# Patient Record
Sex: FEMALE | URBAN - METROPOLITAN AREA
[De-identification: names, ages, dates, MRNs, and addresses within clinical notes are randomized per-mention and may not be internally consistent; named-entity substitution may affect disease eponyms.]

---

## 2023-06-05 ENCOUNTER — OFFICE (OUTPATIENT)
Dept: URBAN - METROPOLITAN AREA CLINIC 92 | Facility: CLINIC | Age: 33
Setting detail: OPHTHALMOLOGY
End: 2023-06-05
Payer: COMMERCIAL

## 2023-06-05 DIAGNOSIS — H16.041: ICD-10-CM

## 2023-06-05 PROCEDURE — 99214 OFFICE O/P EST MOD 30 MIN: CPT | Performed by: OPHTHALMOLOGY

## 2023-06-05 PROCEDURE — 92285 EXTERNAL OCULAR PHOTOGRAPHY: CPT | Performed by: OPHTHALMOLOGY

## 2023-06-05 ASSESSMENT — KERATOMETRY
OD_K1POWER_DIOPTERS: 42.25
OS_K2POWER_DIOPTERS: 43.00
OD_AXISANGLE_DEGREES: 067
METHOD_AUTO_MANUAL: AUTO
OS_AXISANGLE_DEGREES: 108
OD_K2POWER_DIOPTERS: 43.25
OS_K1POWER_DIOPTERS: 42.25

## 2023-06-05 ASSESSMENT — REFRACTION_CURRENTRX
OS_AXIS: 115
OS_OVR_VA: 20/
OS_VPRISM_DIRECTION: SV
OS_SPHERE: -11.75
OD_SPHERE: -11.00
OD_VPRISM_DIRECTION: SV
OD_OVR_VA: 20/
OD_CYLINDER: +1.75
OD_AXIS: 051
OS_CYLINDER: +0.75

## 2023-06-05 ASSESSMENT — REFRACTION_AUTOREFRACTION
OD_CYLINDER: +1.00
OS_CYLINDER: +1.00
OS_SPHERE: -12.75
OD_AXIS: 045
OS_AXIS: 096
OD_SPHERE: -11.25

## 2023-06-05 ASSESSMENT — LID EXAM ASSESSMENTS
OD_BLEPHARITIS: RLL RUL 2+
OS_BLEPHARITIS: LLL LUL 2+

## 2023-06-05 ASSESSMENT — AXIALLENGTH_DERIVED
OS_AL: 30.09
OD_AL: 29.0973

## 2023-06-05 ASSESSMENT — SPHEQUIV_DERIVED
OS_SPHEQUIV: -12.25
OD_SPHEQUIV: -10.75

## 2023-06-05 ASSESSMENT — CORNEAL SURGICAL SCARRING: OD_SCARRING: MID PERIPHERAL ANTERIOR STROMAL

## 2023-06-05 ASSESSMENT — SUPERFICIAL PUNCTATE KERATITIS (SPK)
OS_SPK: T
OD_SPK: T

## 2023-06-05 ASSESSMENT — VISUAL ACUITY
OS_BCVA: 20/20-1
OD_BCVA: 20/20

## 2023-06-07 ENCOUNTER — OFFICE (OUTPATIENT)
Dept: URBAN - METROPOLITAN AREA CLINIC 92 | Facility: CLINIC | Age: 33
Setting detail: OPHTHALMOLOGY
End: 2023-06-07
Payer: COMMERCIAL

## 2023-06-07 DIAGNOSIS — H16.041: ICD-10-CM

## 2023-06-07 PROCEDURE — 92285 EXTERNAL OCULAR PHOTOGRAPHY: CPT | Performed by: OPHTHALMOLOGY

## 2023-06-07 PROCEDURE — 99214 OFFICE O/P EST MOD 30 MIN: CPT | Performed by: OPHTHALMOLOGY

## 2023-06-07 ASSESSMENT — KERATOMETRY
OS_K2POWER_DIOPTERS: 43.21
OD_K1POWER_DIOPTERS: 42.25
OS_AXISANGLE_DEGREES: 106
OS_K1POWER_DIOPTERS: 42.25
METHOD_AUTO_MANUAL: AUTO
OD_K2POWER_DIOPTERS: 43.25
OD_AXISANGLE_DEGREES: 073

## 2023-06-07 ASSESSMENT — VISUAL ACUITY
OD_BCVA: 20/20
OS_BCVA: 20/20-2

## 2023-06-07 ASSESSMENT — REFRACTION_AUTOREFRACTION
OS_SPHERE: -13.00
OS_CYLINDER: +1.25
OS_AXIS: 102
OD_SPHERE: -11.25
OD_AXIS: 058
OD_CYLINDER: +1.00

## 2023-06-07 ASSESSMENT — REFRACTION_CURRENTRX
OD_AXIS: 051
OS_CYLINDER: +0.75
OS_AXIS: 115
OD_OVR_VA: 20/
OD_CYLINDER: +1.75
OS_SPHERE: -11.75
OD_VPRISM_DIRECTION: SV
OD_SPHERE: -11.00
OS_VPRISM_DIRECTION: SV
OS_OVR_VA: 20/

## 2023-06-07 ASSESSMENT — SPHEQUIV_DERIVED
OD_SPHEQUIV: -10.75
OS_SPHEQUIV: -12.375

## 2023-06-07 ASSESSMENT — SUPERFICIAL PUNCTATE KERATITIS (SPK)
OD_SPK: T
OS_SPK: T

## 2023-06-07 ASSESSMENT — AXIALLENGTH_DERIVED
OD_AL: 29.0973
OS_AL: 30.11

## 2023-06-07 ASSESSMENT — CORNEAL SURGICAL SCARRING: OD_SCARRING: MID PERIPHERAL ANTERIOR STROMAL

## 2023-06-10 ENCOUNTER — OFFICE (OUTPATIENT)
Dept: URBAN - METROPOLITAN AREA CLINIC 92 | Facility: CLINIC | Age: 33
Setting detail: OPHTHALMOLOGY
End: 2023-06-10
Payer: COMMERCIAL

## 2023-06-10 DIAGNOSIS — H16.041: ICD-10-CM

## 2023-06-10 PROCEDURE — 99213 OFFICE O/P EST LOW 20 MIN: CPT | Performed by: OPHTHALMOLOGY

## 2023-06-10 ASSESSMENT — REFRACTION_CURRENTRX
OD_VPRISM_DIRECTION: SV
OD_CYLINDER: +1.75
OS_OVR_VA: 20/
OS_VPRISM_DIRECTION: SV
OS_AXIS: 115
OD_OVR_VA: 20/
OD_SPHERE: -11.00
OD_AXIS: 051
OS_SPHERE: -11.75
OS_CYLINDER: +0.75

## 2023-06-10 ASSESSMENT — AXIALLENGTH_DERIVED
OD_AL: 29.0973
OS_AL: 30.11
OS_AL: 30.11
OD_AL: 29.0973

## 2023-06-10 ASSESSMENT — KERATOMETRY
OD_K1POWER_DIOPTERS: 42.25
METHOD_AUTO_MANUAL: AUTO
OD_AXISANGLE_DEGREES: 073
OS_K1POWER_DIOPTERS: 42.25
OS_K2POWER_DIOPTERS: 43.21
OD_K2POWER_DIOPTERS: 43.25
OS_AXISANGLE_DEGREES: 106

## 2023-06-10 ASSESSMENT — REFRACTION_MANIFEST
OD_CYLINDER: +1.00
OS_CYLINDER: +1.25
OD_SPHERE: -11.25
OS_SPHERE: -13.00
OD_AXIS: 058
OS_AXIS: 102

## 2023-06-10 ASSESSMENT — REFRACTION_AUTOREFRACTION
OD_CYLINDER: +1.00
OD_SPHERE: -11.25
OS_SPHERE: -13.00
OD_AXIS: 058
OS_AXIS: 102
OS_CYLINDER: +1.25

## 2023-06-10 ASSESSMENT — VISUAL ACUITY
OS_BCVA: 20/20-2
OD_BCVA: 20/20

## 2023-06-10 ASSESSMENT — SPHEQUIV_DERIVED
OD_SPHEQUIV: -10.75
OD_SPHEQUIV: -10.75
OS_SPHEQUIV: -12.375
OS_SPHEQUIV: -12.375

## 2023-06-10 ASSESSMENT — CORNEAL SURGICAL SCARRING: OD_SCARRING: MID PERIPHERAL ANTERIOR STROMAL

## 2023-06-10 ASSESSMENT — SUPERFICIAL PUNCTATE KERATITIS (SPK): OS_SPK: T

## 2023-06-19 ENCOUNTER — OFFICE (OUTPATIENT)
Dept: URBAN - METROPOLITAN AREA CLINIC 92 | Facility: CLINIC | Age: 33
Setting detail: OPHTHALMOLOGY
End: 2023-06-19
Payer: COMMERCIAL

## 2023-06-19 DIAGNOSIS — H16.041: ICD-10-CM

## 2023-06-19 PROBLEM — H16.221 DRY EYE SYNDROME K SICCA;  , RIGHT EYE: Status: ACTIVE | Noted: 2023-06-07

## 2023-06-19 PROBLEM — H01.002 BLEPHARITIS; RIGHT UPPER LID, RIGHT LOWER LID, LEFT UPPER LID, LEFT LOWER LID: Status: ACTIVE | Noted: 2023-06-05

## 2023-06-19 PROBLEM — H01.005 BLEPHARITIS; RIGHT UPPER LID, RIGHT LOWER LID, LEFT UPPER LID, LEFT LOWER LID: Status: ACTIVE | Noted: 2023-06-05

## 2023-06-19 PROBLEM — H01.001 BLEPHARITIS; RIGHT UPPER LID, RIGHT LOWER LID, LEFT UPPER LID, LEFT LOWER LID: Status: ACTIVE | Noted: 2023-06-05

## 2023-06-19 PROBLEM — H01.004 BLEPHARITIS; RIGHT UPPER LID, RIGHT LOWER LID, LEFT UPPER LID, LEFT LOWER LID: Status: ACTIVE | Noted: 2023-06-05

## 2023-06-19 PROCEDURE — 99213 OFFICE O/P EST LOW 20 MIN: CPT | Performed by: OPHTHALMOLOGY

## 2023-06-19 ASSESSMENT — REFRACTION_CURRENTRX
OS_SPHERE: -11.75
OS_OVR_VA: 20/
OS_VPRISM_DIRECTION: SV
OD_AXIS: 051
OD_SPHERE: -11.00
OS_AXIS: 115
OS_CYLINDER: +0.75
OD_CYLINDER: +1.75
OD_OVR_VA: 20/
OD_VPRISM_DIRECTION: SV

## 2023-06-19 ASSESSMENT — AXIALLENGTH_DERIVED
OD_AL: 29.0973
OD_AL: 29.0973
OS_AL: 30.11
OS_AL: 30.11

## 2023-06-19 ASSESSMENT — KERATOMETRY
OS_K2POWER_DIOPTERS: 43.21
METHOD_AUTO_MANUAL: AUTO
OD_K2POWER_DIOPTERS: 43.25
OS_AXISANGLE_DEGREES: 106
OD_K1POWER_DIOPTERS: 42.25
OS_K1POWER_DIOPTERS: 42.25
OD_AXISANGLE_DEGREES: 073

## 2023-06-19 ASSESSMENT — REFRACTION_MANIFEST
OS_SPHERE: -13.00
OD_AXIS: 058
OS_CYLINDER: +1.25
OD_SPHERE: -11.25
OS_AXIS: 102
OD_CYLINDER: +1.00

## 2023-06-19 ASSESSMENT — REFRACTION_AUTOREFRACTION
OD_CYLINDER: +1.00
OS_SPHERE: -13.00
OD_AXIS: 058
OS_AXIS: 102
OS_CYLINDER: +1.25
OD_SPHERE: -11.25

## 2023-06-19 ASSESSMENT — VISUAL ACUITY
OS_BCVA: 20/20-2
OD_BCVA: 20/20

## 2023-06-19 ASSESSMENT — SPHEQUIV_DERIVED
OS_SPHEQUIV: -12.375
OD_SPHEQUIV: -10.75
OD_SPHEQUIV: -10.75
OS_SPHEQUIV: -12.375

## 2023-06-19 ASSESSMENT — SUPERFICIAL PUNCTATE KERATITIS (SPK): OS_SPK: T

## 2023-06-19 ASSESSMENT — CORNEAL SURGICAL SCARRING: OD_SCARRING: MID PERIPHERAL ANTERIOR STROMAL

## 2023-12-27 ENCOUNTER — APPOINTMENT (OUTPATIENT)
Dept: ANTEPARTUM | Facility: CLINIC | Age: 33
End: 2023-12-27
Payer: COMMERCIAL

## 2023-12-27 ENCOUNTER — ASOB RESULT (OUTPATIENT)
Age: 33
End: 2023-12-27

## 2023-12-27 PROCEDURE — 76813 OB US NUCHAL MEAS 1 GEST: CPT

## 2023-12-27 PROCEDURE — 36415 COLL VENOUS BLD VENIPUNCTURE: CPT

## 2023-12-27 PROCEDURE — 93976 VASCULAR STUDY: CPT

## 2024-02-20 PROBLEM — Z00.00 ENCOUNTER FOR PREVENTIVE HEALTH EXAMINATION: Status: ACTIVE | Noted: 2024-02-20

## 2024-02-26 ENCOUNTER — APPOINTMENT (OUTPATIENT)
Dept: ANTEPARTUM | Facility: CLINIC | Age: 34
End: 2024-02-26
Payer: COMMERCIAL

## 2024-02-26 ENCOUNTER — ASOB RESULT (OUTPATIENT)
Age: 34
End: 2024-02-26

## 2024-02-26 PROCEDURE — 76817 TRANSVAGINAL US OBSTETRIC: CPT

## 2024-02-26 PROCEDURE — 76811 OB US DETAILED SNGL FETUS: CPT

## 2024-04-29 ENCOUNTER — ASOB RESULT (OUTPATIENT)
Age: 34
End: 2024-04-29

## 2024-04-29 ENCOUNTER — APPOINTMENT (OUTPATIENT)
Dept: ANTEPARTUM | Facility: CLINIC | Age: 34
End: 2024-04-29
Payer: COMMERCIAL

## 2024-04-29 PROCEDURE — 76816 OB US FOLLOW-UP PER FETUS: CPT

## 2024-04-29 PROCEDURE — 76819 FETAL BIOPHYS PROFIL W/O NST: CPT

## 2024-04-29 PROCEDURE — 76820 UMBILICAL ARTERY ECHO: CPT | Mod: 59

## 2024-06-10 ENCOUNTER — ASOB RESULT (OUTPATIENT)
Age: 34
End: 2024-06-10

## 2024-06-10 ENCOUNTER — APPOINTMENT (OUTPATIENT)
Dept: ANTEPARTUM | Facility: CLINIC | Age: 34
End: 2024-06-10
Payer: COMMERCIAL

## 2024-06-10 PROCEDURE — 76820 UMBILICAL ARTERY ECHO: CPT | Mod: 59

## 2024-06-10 PROCEDURE — 76819 FETAL BIOPHYS PROFIL W/O NST: CPT

## 2024-06-10 PROCEDURE — 76816 OB US FOLLOW-UP PER FETUS: CPT

## 2024-06-19 ENCOUNTER — APPOINTMENT (OUTPATIENT)
Dept: ANTEPARTUM | Facility: CLINIC | Age: 34
End: 2024-06-19
Payer: COMMERCIAL

## 2024-06-19 ENCOUNTER — ASOB RESULT (OUTPATIENT)
Age: 34
End: 2024-06-19

## 2024-06-19 PROCEDURE — 76815 OB US LIMITED FETUS(S): CPT

## 2024-06-19 PROCEDURE — 76819 FETAL BIOPHYS PROFIL W/O NST: CPT

## 2024-06-19 PROCEDURE — 76820 UMBILICAL ARTERY ECHO: CPT

## 2024-06-20 ENCOUNTER — TRANSCRIPTION ENCOUNTER (OUTPATIENT)
Age: 34
End: 2024-06-20

## 2024-06-26 ENCOUNTER — APPOINTMENT (OUTPATIENT)
Dept: ANTEPARTUM | Facility: CLINIC | Age: 34
End: 2024-06-26
Payer: COMMERCIAL

## 2024-06-26 ENCOUNTER — ASOB RESULT (OUTPATIENT)
Age: 34
End: 2024-06-26

## 2024-06-26 PROCEDURE — 76816 OB US FOLLOW-UP PER FETUS: CPT

## 2024-06-26 PROCEDURE — 76820 UMBILICAL ARTERY ECHO: CPT | Mod: 59

## 2024-06-26 PROCEDURE — 76819 FETAL BIOPHYS PROFIL W/O NST: CPT | Mod: 59

## 2024-07-03 ENCOUNTER — ASOB RESULT (OUTPATIENT)
Age: 34
End: 2024-07-03

## 2024-07-03 ENCOUNTER — APPOINTMENT (OUTPATIENT)
Dept: ANTEPARTUM | Facility: CLINIC | Age: 34
End: 2024-07-03
Payer: COMMERCIAL

## 2024-07-03 PROCEDURE — 76819 FETAL BIOPHYS PROFIL W/O NST: CPT

## 2024-07-09 ENCOUNTER — INPATIENT (INPATIENT)
Facility: HOSPITAL | Age: 34
LOS: 1 days | Discharge: HOME | End: 2024-07-11
Attending: OBSTETRICS & GYNECOLOGY | Admitting: OBSTETRICS & GYNECOLOGY
Payer: COMMERCIAL

## 2024-07-09 VITALS
DIASTOLIC BLOOD PRESSURE: 71 MMHG | WEIGHT: 244.93 LBS | TEMPERATURE: 98 F | RESPIRATION RATE: 18 BRPM | SYSTOLIC BLOOD PRESSURE: 129 MMHG | HEART RATE: 69 BPM | HEIGHT: 71 IN

## 2024-07-09 LAB
BASOPHILS # BLD AUTO: 0.03 K/UL — SIGNIFICANT CHANGE UP (ref 0–0.2)
BASOPHILS NFR BLD AUTO: 0.3 % — SIGNIFICANT CHANGE UP (ref 0–2)
BLD GP AB SCN SERPL QL: NEGATIVE — SIGNIFICANT CHANGE UP
EOSINOPHIL # BLD AUTO: 0.16 K/UL — SIGNIFICANT CHANGE UP (ref 0–0.5)
EOSINOPHIL NFR BLD AUTO: 1.6 % — SIGNIFICANT CHANGE UP (ref 0–6)
HCT VFR BLD CALC: 37.2 % — SIGNIFICANT CHANGE UP (ref 34.5–45)
HGB BLD-MCNC: 12.7 G/DL — SIGNIFICANT CHANGE UP (ref 11.5–15.5)
IMM GRANULOCYTES NFR BLD AUTO: 1.3 % — HIGH (ref 0–0.9)
LYMPHOCYTES # BLD AUTO: 1.67 K/UL — SIGNIFICANT CHANGE UP (ref 1–3.3)
LYMPHOCYTES # BLD AUTO: 17.2 % — SIGNIFICANT CHANGE UP (ref 13–44)
MCHC RBC-ENTMCNC: 33.9 PG — SIGNIFICANT CHANGE UP (ref 27–34)
MCHC RBC-ENTMCNC: 34.1 GM/DL — SIGNIFICANT CHANGE UP (ref 32–36)
MCV RBC AUTO: 99.2 FL — SIGNIFICANT CHANGE UP (ref 80–100)
MONOCYTES # BLD AUTO: 0.61 K/UL — SIGNIFICANT CHANGE UP (ref 0–0.9)
MONOCYTES NFR BLD AUTO: 6.3 % — SIGNIFICANT CHANGE UP (ref 2–14)
NEUTROPHILS # BLD AUTO: 7.11 K/UL — SIGNIFICANT CHANGE UP (ref 1.8–7.4)
NEUTROPHILS NFR BLD AUTO: 73.3 % — SIGNIFICANT CHANGE UP (ref 43–77)
NRBC # BLD: 0 /100 WBCS — SIGNIFICANT CHANGE UP (ref 0–0)
PLATELET # BLD AUTO: 146 K/UL — LOW (ref 150–400)
RBC # BLD: 3.75 M/UL — LOW (ref 3.8–5.2)
RBC # FLD: 12.7 % — SIGNIFICANT CHANGE UP (ref 10.3–14.5)
RH IG SCN BLD-IMP: POSITIVE — SIGNIFICANT CHANGE UP
RH IG SCN BLD-IMP: POSITIVE — SIGNIFICANT CHANGE UP
WBC # BLD: 9.71 K/UL — SIGNIFICANT CHANGE UP (ref 3.8–10.5)
WBC # FLD AUTO: 9.71 K/UL — SIGNIFICANT CHANGE UP (ref 3.8–10.5)

## 2024-07-09 RX ORDER — OXYTOCIN 30 [USP'U]/500ML
2 INJECTION, SOLUTION INTRAVENOUS
Qty: 30 | Refills: 0 | Status: DISCONTINUED | OUTPATIENT
Start: 2024-07-09 | End: 2024-07-11

## 2024-07-09 RX ORDER — OXYTOCIN 30 [USP'U]/500ML
333.33 INJECTION, SOLUTION INTRAVENOUS
Qty: 20 | Refills: 0 | Status: DISCONTINUED | OUTPATIENT
Start: 2024-07-09 | End: 2024-07-10

## 2024-07-09 RX ORDER — TRISODIUM CITRATE DIHYDRATE AND CITRIC ACID MONOHYDRATE 500; 334 MG/5ML; MG/5ML
15 SOLUTION ORAL EVERY 6 HOURS
Refills: 0 | Status: DISCONTINUED | OUTPATIENT
Start: 2024-07-09 | End: 2024-07-10

## 2024-07-09 RX ORDER — DEXTROSE MONOHYDRATE AND SODIUM CHLORIDE 5; .3 G/100ML; G/100ML
1000 INJECTION, SOLUTION INTRAVENOUS
Refills: 0 | Status: DISCONTINUED | OUTPATIENT
Start: 2024-07-09 | End: 2024-07-10

## 2024-07-09 RX ADMIN — DEXTROSE MONOHYDRATE AND SODIUM CHLORIDE 125 MILLILITER(S): 5; .3 INJECTION, SOLUTION INTRAVENOUS at 21:18

## 2024-07-09 RX ADMIN — OXYTOCIN 2 MILLIUNIT(S)/MIN: 30 INJECTION, SOLUTION INTRAVENOUS at 23:30

## 2024-07-09 NOTE — OB RN PATIENT PROFILE - WEIGHT IN LBS
Referral made to [facility]  home visit program.    Daniel Lundy, [address] [facility] [phone]

## 2024-07-09 NOTE — OB RN PATIENT PROFILE - NS PRO RUBELLA RECEIVED Y/N
Per patient has not seen a movement specialist yet has an appointment in January.   Any advice on what to do in the mean time no...

## 2024-07-09 NOTE — OB PROVIDER H&P - HISTORY OF PRESENT ILLNESS
Patient is a [ ] y.o. G[ ]P[ ] @[ ]w[ ]d who presents _. Denies ctx, LOF, vaginal bleeding. Endorses fetal movement. Last VE in the office [ ].  ROS    Natural vs IVF pregnancy, uncomplicated. FEROZ [ ]. NIPT and anatomy scan WNL. Passed GCT. Denies elevated BPs in the pregnancy. EFW [ ].  GBS negative.   [Date]:     Ob hx: Denies  Gyn hx: Denies  PMH: Denies  Meds: PNV  PSH: Denies  Allergies: Denies    Physical Exam  BP    HR   Gen: Well-appearing. NAD.  Resp: Breathing comfortably on RA  Abd: Gravid uterus. Soft, non-tender, non-distended.  VE: _  Sono: _presentation. _placenta. BPP _/8, SANDRA _.  FHT: Baseline _, moderate variability, +accels, -decels  [place]: Ctx_    A/P  [ ] yo G[ ]P[ ] @[ ]w[ ]d presenting [ ].     -     Deepa Peguero, PGY1  Discussed with  Patient is a 34y.o.  @41w0d who presents for IOL for post-dates. Denies ctx, LOF, vaginal bleeding. Endorses fetal movement. Last VE in the office 1 week ago, 1 cm. Pt has no complaints at this time.     IVF pregnancy-own egg, PGT-A wnl per patient. Pregnancy uncomplicated. FEROZ 24. NIPT and anatomy scan WNL. Passed GCT. Denies elevated BPs in the pregnancy. EFW 3000g.  GBS negative. Subclinical hypothyroidism of pregnancy   7/3/2024: cephalic, posterior placenta; BPP 8/8; SANDRA 16.89    Ob hx: Denies  Gyn hx: fibroids, 1 anterior subserosal 4x3x4, 1 posterior right lateral subserosal/intramural 3x3x3  PMH: Denies  Meds: PNV, synthroid 50mcg  PSH: Denies  Allergies: Denies    Physical Exam  /71  HR 68  Gen: Well-appearing. NAD.  Resp: Breathing comfortably on RA  Abd: Gravid uterus. Soft, non-tender, non-distended.  VE: deferred  Sono: cephalic presentation  FHT: Baseline 135, moderate variability, +accels, -decels; Cat I tracing  [place]: uterine irritability on the TOCO; no ctx    A/P  33 yo  @41w0d presenting IOL for post dates.  -Pt normotensive with no toxic complaints  -Fetal status reassuring given Cat I tracing  -Admit to L&D  -Prenatals reviewed  -Labs sent  -Risks/benefits/alternatives discussed; consents signed and in chart  -Plan for balloon and pitocin  -continuous FHT and TOCO    Deepa Peguero, PGY1  Discussed with Dr. Valle and Dr. Mccain, PGY4

## 2024-07-09 NOTE — OB PROVIDER IHI INDUCTION/AUGMENTATION NOTE - NSCHECKLIST_OBGYN_ALL_OB_CAL
Comments:     Last Office Visit (last PCP visit):   8/3/2023    Next Visit Date:  Future Appointments   Date Time Provider [address]   8/16/2023 10:00 AM [facility] DENS[facility] ROOM 1 [facility] RAD   8/18/2023 11:00 AM Tamie Hernandez  [address]   2/5/2024 10:15 AM Jeremy Hicks MD [address]   8/8/2024 10:00 AM Joshua Canada MD [facility]       **If hasn't been seen in over a year OR hasn't followed up according to last diabetes/ADHD visit, make appointment for patient before sending refill to provider.     Rx requested:  Requested Prescriptions     Pending Prescriptions Disp Refills    TRULICITY 0.45 LK/7.2WX SOPN [Pharmacy Med Name: Trwebme 8.19 ILSA/9.8AV Subcutaneous Solution Pen-injector] 6 mL 3     Sig: INJECT THE CONTENTS OF ONE PEN  SUBCUTANEOUSLY WEEKLY AS  DIRECTED
5

## 2024-07-10 LAB — T PALLIDUM AB TITR SER: NEGATIVE — SIGNIFICANT CHANGE UP

## 2024-07-10 PROCEDURE — 88307 TISSUE EXAM BY PATHOLOGIST: CPT | Mod: 26

## 2024-07-10 RX ORDER — OXYTOCIN 30 [USP'U]/500ML
41.67 INJECTION, SOLUTION INTRAVENOUS
Qty: 20 | Refills: 0 | Status: DISCONTINUED | OUTPATIENT
Start: 2024-07-10 | End: 2024-07-11

## 2024-07-10 RX ORDER — OXYCODONE HYDROCHLORIDE 100 MG/5ML
5 SOLUTION ORAL
Refills: 0 | Status: DISCONTINUED | OUTPATIENT
Start: 2024-07-10 | End: 2024-07-11

## 2024-07-10 RX ORDER — OXYCODONE HYDROCHLORIDE 100 MG/5ML
5 SOLUTION ORAL ONCE
Refills: 0 | Status: DISCONTINUED | OUTPATIENT
Start: 2024-07-10 | End: 2024-07-11

## 2024-07-10 RX ORDER — HYDROCORTISONE ACETATE 1 %
1 OINTMENT (GRAM) RECTAL EVERY 4 HOURS
Refills: 0 | Status: DISCONTINUED | OUTPATIENT
Start: 2024-07-10 | End: 2024-07-11

## 2024-07-10 RX ORDER — ACETAMINOPHEN 325 MG
975 TABLET ORAL
Refills: 0 | Status: DISCONTINUED | OUTPATIENT
Start: 2024-07-10 | End: 2024-07-11

## 2024-07-10 RX ORDER — KETOROLAC TROMETHAMINE 30 MG/ML
30 INJECTION, SOLUTION INTRAMUSCULAR ONCE
Refills: 0 | Status: DISCONTINUED | OUTPATIENT
Start: 2024-07-10 | End: 2024-07-10

## 2024-07-10 RX ORDER — DIBUCAINE 1 %
1 OINTMENT (GRAM) TOPICAL EVERY 6 HOURS
Refills: 0 | Status: DISCONTINUED | OUTPATIENT
Start: 2024-07-10 | End: 2024-07-11

## 2024-07-10 RX ORDER — DIPHENHYDRAMINE HCL 12.5MG/5ML
25 ELIXIR ORAL EVERY 6 HOURS
Refills: 0 | Status: DISCONTINUED | OUTPATIENT
Start: 2024-07-10 | End: 2024-07-11

## 2024-07-10 RX ORDER — PRAMOXINE HCL 1 %
1 CREAM (GRAM) RECTAL EVERY 4 HOURS
Refills: 0 | Status: DISCONTINUED | OUTPATIENT
Start: 2024-07-10 | End: 2024-07-11

## 2024-07-10 RX ORDER — BENZOCAINE 15 %
1 LIQUID (ML) TOPICAL EVERY 6 HOURS
Refills: 0 | Status: DISCONTINUED | OUTPATIENT
Start: 2024-07-10 | End: 2024-07-11

## 2024-07-10 RX ORDER — PRENATAL VIT/IRON FUM/FOLIC AC 60 MG-1 MG
1 TABLET ORAL DAILY
Refills: 0 | Status: DISCONTINUED | OUTPATIENT
Start: 2024-07-10 | End: 2024-07-11

## 2024-07-10 RX ORDER — LANOLIN
1 WAX (GRAM) MISCELLANEOUS EVERY 6 HOURS
Refills: 0 | Status: DISCONTINUED | OUTPATIENT
Start: 2024-07-10 | End: 2024-07-11

## 2024-07-10 RX ORDER — SODIUM CHLORIDE 0.9 % (FLUSH) 0.9 %
3 SYRINGE (ML) INJECTION EVERY 8 HOURS
Refills: 0 | Status: DISCONTINUED | OUTPATIENT
Start: 2024-07-10 | End: 2024-07-11

## 2024-07-10 RX ORDER — HYDROCORTISONE VALERATE 0.2 %
1 CREAM (GRAM) TOPICAL EVERY 6 HOURS
Refills: 0 | Status: DISCONTINUED | OUTPATIENT
Start: 2024-07-10 | End: 2024-07-11

## 2024-07-10 RX ORDER — SIMETHICONE 40MG/0.6ML
80 SUSPENSION, DROPS(FINAL DOSAGE FORM)(ML) ORAL EVERY 4 HOURS
Refills: 0 | Status: DISCONTINUED | OUTPATIENT
Start: 2024-07-10 | End: 2024-07-11

## 2024-07-10 RX ORDER — TETANUS TOXOID, REDUCED DIPHTHERIA TOXOID AND ACELLULAR PERTUSSIS VACCINE, ADSORBED 5; 2.5; 8; 8; 2.5 [IU]/.5ML; [IU]/.5ML; UG/.5ML; UG/.5ML; UG/.5ML
0.5 SUSPENSION INTRAMUSCULAR ONCE
Refills: 0 | Status: DISCONTINUED | OUTPATIENT
Start: 2024-07-10 | End: 2024-07-11

## 2024-07-10 RX ADMIN — KETOROLAC TROMETHAMINE 30 MILLIGRAM(S): 30 INJECTION, SOLUTION INTRAMUSCULAR at 12:06

## 2024-07-10 RX ADMIN — Medication 975 MILLIGRAM(S): at 15:23

## 2024-07-10 RX ADMIN — DEXTROSE MONOHYDRATE AND SODIUM CHLORIDE 125 MILLILITER(S): 5; .3 INJECTION, SOLUTION INTRAVENOUS at 06:09

## 2024-07-10 RX ADMIN — Medication 1 TABLET(S): at 15:23

## 2024-07-10 RX ADMIN — DEXTROSE MONOHYDRATE AND SODIUM CHLORIDE 125 MILLILITER(S): 5; .3 INJECTION, SOLUTION INTRAVENOUS at 07:33

## 2024-07-10 RX ADMIN — Medication 600 MILLIGRAM(S): at 23:53

## 2024-07-10 RX ADMIN — Medication 600 MILLIGRAM(S): at 18:13

## 2024-07-10 RX ADMIN — OXYTOCIN 125 MILLIUNIT(S)/MIN: 30 INJECTION, SOLUTION INTRAVENOUS at 09:41

## 2024-07-10 RX ADMIN — Medication 250 MILLILITER(S): at 07:32

## 2024-07-10 RX ADMIN — Medication 3 MILLILITER(S): at 14:42

## 2024-07-10 RX ADMIN — Medication 975 MILLIGRAM(S): at 21:36

## 2024-07-10 NOTE — OB RN DELIVERY SUMMARY - NSSELHIDDEN_OBGYN_ALL_OB_FT
[NS_DeliveryAttending1_OBGYN_ALL_OB_FT:[id number]],[NS_DeliveryAssist1_OBGYN_ALL_OB_FT:[id number]],[NS_DeliveryRN_OBGYN_ALL_OB_FT:[id number]],[NS_CirculateRN2_OBGYN_ALL_OB_FT:[id number]]

## 2024-07-10 NOTE — OB PROVIDER DELIVERY SUMMARY - NSPROVIDERDELIVERYNOTE_OBGYN_ALL_OB_FT
Pt delivered  in OA position over intact perineum. Loose nuchal cord x 1 was noted, which was reduced prior to delivery of the shoulders. The shoulders delivered spontaneously. The  was placed on the maternal abdomen and cried vigorously. The placenta delivered spontaneously. The vagina was inspected and a 2nd degree and bilateral labial lacerations were noted. They were repaired with 2-0 and 3-0 vicryl suture. Adequate hemostasis was achieved. Mild lower uterine segment atony was noted which resolved with pitocin and 1000 mcg of misoprostol per rectum. Mother and baby recovering well in LDR. Apgars 9/9. EBL 700cc.

## 2024-07-10 NOTE — OB PROVIDER LABOR PROGRESS NOTE - NS_SUBJECTIVE/OBJECTIVE_OBGYN_ALL_OB_FT
Patient c/o rectal pressure.  Just received epidural after cervical mark balloon out.    VE 7/80/-1, bulging membranes  Pitocin at 8mu.  FHT Category I with moderate variability and accelerations  Irregular contractions    Continue current management.  Discussed with Dr. Valle.
Pt seen at bedside for balloon placement. Crowley balloon placed 80cc to tension without difficulty. SVE 2/50/-3. Plan to start pitocin shortly
pit on at 10mu  VE: 3 around the balloon
pt with balloon in situ  Pitocin started at 2330
Pt now s/p balloon, balloon came out on its own
pit on at 6mu
Pt feeling intermittent pressure

## 2024-07-10 NOTE — OB RN DELIVERY SUMMARY - NS_SEPSISRSKCALC_OBGYN_ALL_OB_FT
EOS calculated successfully. EOS Risk Factor: 0.5/1000 live births ([facility] national incidence); GA=40w1d; Temp=97.5; ROM=1.017; GBS='Negative'; Antibiotics='No antibiotics or any antibiotics < 2 hrs prior to birth'

## 2024-07-10 NOTE — PRE-ANESTHESIA EVALUATION ADULT - NSANTHOSAYNRD_GEN_A_CORE
No. YISEL screening performed.  STOP BANG Legend: 0-2 = LOW Risk; 3-4 = INTERMEDIATE Risk; 5-8 = HIGH Risk

## 2024-07-10 NOTE — OB PROVIDER LABOR PROGRESS NOTE - NS_OBIHIFHRDETAILS_OBGYN_ALL_OB_FT
baseline 125, moderate variability, +accels; -decels; Cat I tracing
baseline 135, moderate variability, +accels, -decels; Cat I tracing  some loss of contact
135 baseline, mod antoine, + accel, - decel; cat I
baseline 140, moderate variability, +accels, -decels; Cat I tracing
135bpm, mod antoine, + accels, - decels

## 2024-07-10 NOTE — OB RN DELIVERY SUMMARY - NS_LABORROOM_OBGYN_ALL_OB_FT
Pt resting on cart. Breathing easy and non labored, no distress noted. Vss. Pt appropriate for discharge. Answered all questions and provided discharge instructions.   
7

## 2024-07-10 NOTE — OB PROVIDER LABOR PROGRESS NOTE - NS_OBIHICONTRACTIONPATTERNDETAILS_OBGYN_ALL_OB_FT
uterine irritablity
ctx q3puhtilg
uterine irritability on TOCO
ctx q 5 min
ctx irregular, 3 in 10 minutes

## 2024-07-10 NOTE — OB PROVIDER LABOR PROGRESS NOTE - ASSESSMENT
Continue to adjust monitors for loss of contact  Continue to monitor tracing  Plan for VE  Titrate pitocin as needed
Continue to monitor  Titrate pitocin as needed
Continue to monitor  Continue to titrate pitocin
continue to titrate Pitocin   continue to monitor
srom, [facility]  cat 1 tracing  continuous monitoring

## 2024-07-11 ENCOUNTER — TRANSCRIPTION ENCOUNTER (OUTPATIENT)
Age: 34
End: 2024-07-11

## 2024-07-11 VITALS
DIASTOLIC BLOOD PRESSURE: 69 MMHG | OXYGEN SATURATION: 98 % | RESPIRATION RATE: 18 BRPM | TEMPERATURE: 98 F | SYSTOLIC BLOOD PRESSURE: 107 MMHG | HEART RATE: 68 BPM

## 2024-07-11 LAB
HCT VFR BLD CALC: 30.3 % — LOW (ref 34.5–45)
HGB BLD-MCNC: 10.5 G/DL — LOW (ref 11.5–15.5)

## 2024-07-11 PROCEDURE — 36415 COLL VENOUS BLD VENIPUNCTURE: CPT

## 2024-07-11 PROCEDURE — 59050 FETAL MONITOR W/REPORT: CPT

## 2024-07-11 PROCEDURE — 86780 TREPONEMA PALLIDUM: CPT

## 2024-07-11 PROCEDURE — 86901 BLOOD TYPING SEROLOGIC RH(D): CPT

## 2024-07-11 PROCEDURE — 86850 RBC ANTIBODY SCREEN: CPT

## 2024-07-11 PROCEDURE — 86900 BLOOD TYPING SEROLOGIC ABO: CPT

## 2024-07-11 PROCEDURE — 85025 COMPLETE CBC W/AUTO DIFF WBC: CPT

## 2024-07-11 PROCEDURE — 85014 HEMATOCRIT: CPT

## 2024-07-11 PROCEDURE — 85018 HEMOGLOBIN: CPT

## 2024-07-11 RX ORDER — ACETAMINOPHEN 325 MG
3 TABLET ORAL
Qty: 84 | Refills: 0
Start: 2024-07-11 | End: 2024-07-17

## 2024-07-11 RX ORDER — PRENATAL VIT/IRON FUM/FOLIC AC 60 MG-1 MG
1 TABLET ORAL
Qty: 30 | Refills: 0
Start: 2024-07-11 | End: 2024-08-09

## 2024-07-11 RX ADMIN — Medication 975 MILLIGRAM(S): at 15:55

## 2024-07-11 RX ADMIN — Medication 1 TABLET(S): at 13:15

## 2024-07-11 RX ADMIN — Medication 3 MILLILITER(S): at 13:56

## 2024-07-11 RX ADMIN — Medication 975 MILLIGRAM(S): at 08:45

## 2024-07-11 RX ADMIN — Medication 975 MILLIGRAM(S): at 02:50

## 2024-07-11 RX ADMIN — Medication 600 MILLIGRAM(S): at 06:28

## 2024-07-11 RX ADMIN — Medication 600 MILLIGRAM(S): at 13:14

## 2024-07-11 NOTE — LACTATION INITIAL EVALUATION - LACTATION INTERVENTIONS
initiate/review safe skin-to-skin/initiate/review hand expression/initiate/review techniques for position and latch/post discharge community resources provided/review techniques to increase milk supply/review techniques to manage sore nipples/engorgement/reviewed components of an effective feeding and at least 8 effective feedings per day required/reviewed importance of monitoring infant diapers, the breastfeeding log, and minimum output each day/reviewed benefits and recommendations for rooming in/reviewed feeding on demand/by cue at least 8 times a day/reviewed indications of inadequate milk transfer that would require supplementation

## 2024-07-11 NOTE — DISCHARGE NOTE OB - MEDICATION SUMMARY - MEDICATIONS TO TAKE
I will START or STAY ON the medications listed below when I get home from the hospital:    ibuprofen 600 mg oral tablet  -- 1 tab(s) by mouth every 6 hours  -- Indication: For pain    acetaminophen 325 mg oral tablet  -- 3 tab(s) by mouth every 6 hours  -- Indication: For pain    Prenatal Multivitamins with Folic Acid 1 mg oral tablet  -- 1 tab(s) by mouth once a day  -- Indication: For postpartum

## 2024-07-11 NOTE — PROGRESS NOTE ADULT - ASSESSMENT
A/P 34y s/p , PPD#1, stable, meeting postpartum milestones   - Pain: well controlled on tylenol/motrin  - GI: Tolerating regular diet  - : urinating without difficulty/pain  - DVT prophylaxis: ambulating frequently  - Dispo: PPD 2, unless otherwise specified      Emily Patiño, PGY1

## 2024-07-11 NOTE — PROGRESS NOTE ADULT - SUBJECTIVE AND OBJECTIVE BOX
Patient evaluated at bedside.   She reports pain is well controlled with OPM  She denies headache, dizziness, chest pain, palpitations, shortness of breath, nausea, vomiting, heavy vaginal bleeding or perineal discomfort.  She has been ambulating without assistance, voiding spontaneously, and is breastfeeding.    Physical Exam:  T(C): 36.7 (24 @ 08:38), Max: 36.7 (24 @ 08:38)  HR: 68 (24 @ 08:38) (60 - 70)  BP: 107/69 (24 @ 08:38) (107/69 - 113/72)  RR: 18 (24 @ 08:38) (18 - 18)  SpO2: 98% (24 @ 08:38) (98% - 99%)    GA: NAD, A+0 x 3  Breasts: soft, nontender, no palpable masses  Abd: + BS, soft, nontender, nondistended, no rebound or guarding, uterus firm at midline, 2  fb below umbilicus  : lochia WNL  Extremities: no calf tenderness                            10.5   x     )-----------( x        ( 2024 05:30 )             30.3     A/P 34y s/p , PPD #1  ,stable  1. Pain: well controlled on OPM  2. GI: Regular diet  3. : s.p mark  4. DVT prophylaxis: SCDs, ambulate  5. Dispo: DC home, precautions reviewed    
Patient evaluated at bedside this morning, resting comfortable in bed, no acute events overnight.  She reports pain is well controlled with tylenol and motrin.  She denies headache, dizziness, chest pain, palpitations, shortness of breath, nausea, vomiting, fever, chills, heavy vaginal bleeding. She has been ambulating without assistance, voiding spontaneously.  Tolerating food well, without nausea/vomit.      Physical Exam:  T(C): 36.6 (07-11-24 @ 02:00), Max: 36.6 (07-10-24 @ 22:00)  HR: 70 (07-11-24 @ 02:00) (64 - 70)  BP: 111/79 (07-11-24 @ 02:00) (102/66 - 111/79)  RR: 18 (07-11-24 @ 02:00) (18 - 18)  SpO2: 98% (07-11-24 @ 02:00) (97% - 98%)    GA: NAD, A&O x 3  Pulm: no increased work of breathing  Abd: soft, nontender, nondistended, no rebound or guarding, uterus firm.  Extremities: no calf tenderness                          12.7   9.71  )-----------( 146      ( 09 Jul 2024 21:47 )             37.2           acetaminophen     Tablet .. 975 milliGRAM(s) Oral <User Schedule>  benzocaine 20%/menthol 0.5% Spray 1 Spray(s) Topical every 6 hours PRN  dibucaine 1% Ointment 1 Application(s) Topical every 6 hours PRN  diphenhydrAMINE 25 milliGRAM(s) Oral every 6 hours PRN  diphtheria/tetanus/pertussis (acellular) Vaccine (Adacel) 0.5 milliLiter(s) IntraMuscular once  hydrocortisone 1% Cream 1 Application(s) Topical every 6 hours PRN  ibuprofen  Tablet. 600 milliGRAM(s) Oral every 6 hours  lanolin Ointment 1 Application(s) Topical every 6 hours PRN  magnesium hydroxide Suspension 30 milliLiter(s) Oral two times a day PRN  oxyCODONE    IR 5 milliGRAM(s) Oral every 3 hours PRN  oxyCODONE    IR 5 milliGRAM(s) Oral once PRN  oxytocin Infusion 41.667 milliUNIT(s)/Min IV Continuous <Continuous>  oxytocin Infusion. 2 milliUNIT(s)/Min IV Continuous <Continuous>  pramoxine 1%/zinc 5% Cream 1 Application(s) Topical every 4 hours PRN  prenatal multivitamin 1 Tablet(s) Oral daily  simethicone 80 milliGRAM(s) Chew every 4 hours PRN  sodium chloride 0.9% lock flush 3 milliLiter(s) IV Push every 8 hours  witch hazel Pads 1 Application(s) Topical every 4 hours PRN

## 2024-07-11 NOTE — LACTATION INITIAL EVALUATION - NS LACT CON REASON FOR REQ
41.1 wk gestation baby, now 1 day old at this time. Placed the baby STS with the mother while I provided breastfeeding education and explained normal  behaviour and the milk production feedback system. Assisted with positioning in a football hold and taught latch strategies. Baby was able to latch deeply and is feeding well, rhythmically sucking between short pauses of rest. Mother to continue with STS when possible, room-in, and feed as per cues at least 8-12x/ day. To f/u as needed./primaparous mom/staff request/patient request

## 2024-07-11 NOTE — DISCHARGE NOTE OB - PATIENT PORTAL LINK FT
You can access the FollowMyHealth Patient Portal offered by [facility] by registering at the following website: [email or web address]. By joining [employer]’s FollowMyHealth portal, you will also be able to view your health information using other applications (apps) compatible with our system.

## 2024-07-11 NOTE — DISCHARGE NOTE OB - CARE PROVIDER_API CALL
Makenzie Sanchez  Obstetrics and Gynecology  [address] [place] [address]  Phone: [phone]  Fax: [phone]  Follow Up Time:

## 2024-07-11 NOTE — DISCHARGE NOTE OB - HOSPITAL COURSE
Patient presented for an elective IOL for post dates. Patient s/p ,  and received a dose of cytotec. Postpartum state uneventful. Vitals are stable, patient meeting all milestones for discharge.

## 2024-07-11 NOTE — DISCHARGE NOTE OB - CARE PLAN
1 Principal Discharge DX:	Postpartum state  Assessment and plan of treatment:	Vaginal delivery, meeting all postpartum milestones.  Please follow-up with your OB doctor within 6 weeks.  You can resume a regular diet at home and may continue your prenatal vitamins as directed.  Please place nothing in the vagina for 6 weeks (no tampons, sex, douching, tub baths, swimming pools, etc).  If you have severe headaches and/or vision changes, heavy bleeding, or chest pain, please call your provider or go to the nearest Emergency Department.  Please call your OB with any signs of symptoms of infection including fever > 100.4 degrees, severe pain, malodorous vaginal discharge or heavy bleeding requiring more than 1-2 pads/hour.  You can take Motrin 600mg orally every 6 hours for pain as needed.  Secondary Diagnosis:	Postpartum hemorrhage

## 2024-07-15 LAB — SURGICAL PATHOLOGY STUDY: SIGNIFICANT CHANGE UP

## 2024-07-17 DIAGNOSIS — O48.0 POST-TERM PREGNANCY: ICD-10-CM

## 2024-07-17 DIAGNOSIS — D25.1 INTRAMURAL LEIOMYOMA OF UTERUS: ICD-10-CM

## 2024-07-17 DIAGNOSIS — Z79.890 HORMONE REPLACEMENT THERAPY: ICD-10-CM

## 2024-07-17 DIAGNOSIS — E03.8 OTHER SPECIFIED HYPOTHYROIDISM: ICD-10-CM

## 2024-07-17 DIAGNOSIS — O34.13 MATERNAL CARE FOR BENIGN TUMOR OF CORPUS UTERI, THIRD TRIMESTER: ICD-10-CM

## 2024-07-17 DIAGNOSIS — Z3A.41 41 WEEKS GESTATION OF PREGNANCY: ICD-10-CM

## 2024-07-17 DIAGNOSIS — D25.2 SUBSEROSAL LEIOMYOMA OF UTERUS: ICD-10-CM

## 2024-07-17 DIAGNOSIS — Z28.09 IMMUNIZATION NOT CARRIED OUT BECAUSE OF OTHER CONTRAINDICATION: ICD-10-CM

## 2024-12-07 ENCOUNTER — OFFICE (OUTPATIENT)
Facility: LOCATION | Age: 34
Setting detail: OPHTHALMOLOGY
End: 2024-12-07
Payer: COMMERCIAL

## 2024-12-07 DIAGNOSIS — H16.041: ICD-10-CM

## 2024-12-07 PROCEDURE — 92012 INTRM OPH EXAM EST PATIENT: CPT | Performed by: STUDENT IN AN ORGANIZED HEALTH CARE EDUCATION/TRAINING PROGRAM

## 2024-12-07 ASSESSMENT — REFRACTION_CURRENTRX
OS_OVR_VA: 20/
OD_SPHERE: -11.00
OD_CYLINDER: +1.75
OS_AXIS: 115
OD_AXIS: 051
OS_VPRISM_DIRECTION: SV
OS_SPHERE: -11.75
OS_CYLINDER: +0.75
OD_VPRISM_DIRECTION: SV
OD_OVR_VA: 20/

## 2024-12-07 ASSESSMENT — REFRACTION_MANIFEST
OD_CYLINDER: +1.00
OD_AXIS: 058
OS_SPHERE: -13.00
OS_CYLINDER: +1.25
OS_AXIS: 102
OD_SPHERE: -11.25

## 2024-12-07 ASSESSMENT — REFRACTION_AUTOREFRACTION
OD_SPHERE: -11.25
OS_CYLINDER: +1.25
OS_SPHERE: -13.00
OD_CYLINDER: +1.00
OD_AXIS: 058
OS_AXIS: 102

## 2024-12-07 ASSESSMENT — KERATOMETRY
OS_AXISANGLE_DEGREES: 106
METHOD_AUTO_MANUAL: AUTO
OD_AXISANGLE_DEGREES: 073
OD_K1POWER_DIOPTERS: 42.25
OS_K1POWER_DIOPTERS: 42.25
OD_K2POWER_DIOPTERS: 43.25
OS_K2POWER_DIOPTERS: 43.21

## 2024-12-07 ASSESSMENT — LID EXAM ASSESSMENTS
OD_BLEPHARITIS: RLL RUL 2+
OS_BLEPHARITIS: LLL LUL 2+

## 2024-12-07 ASSESSMENT — VISUAL ACUITY
OD_BCVA: 20/25-2
OS_BCVA: 20/20-

## 2024-12-07 ASSESSMENT — CORNEAL SURGICAL SCARRING: OD_SCARRING: MID PERIPHERAL ANTERIOR STROMAL

## 2024-12-07 ASSESSMENT — CONFRONTATIONAL VISUAL FIELD TEST (CVF)
OS_FINDINGS: FULL
OD_FINDINGS: FULL

## 2024-12-07 ASSESSMENT — SUPERFICIAL PUNCTATE KERATITIS (SPK): OS_SPK: T

## 2024-12-08 ENCOUNTER — RX ONLY (RX ONLY)
Age: 34
End: 2024-12-08

## 2024-12-08 ENCOUNTER — OFFICE (OUTPATIENT)
Dept: URBAN - METROPOLITAN AREA CLINIC 92 | Facility: CLINIC | Age: 34
Setting detail: OPHTHALMOLOGY
End: 2024-12-08
Payer: COMMERCIAL

## 2024-12-08 DIAGNOSIS — H16.041: ICD-10-CM

## 2024-12-08 PROCEDURE — 92285 EXTERNAL OCULAR PHOTOGRAPHY: CPT | Performed by: OPHTHALMOLOGY

## 2024-12-08 PROCEDURE — 99213 OFFICE O/P EST LOW 20 MIN: CPT | Performed by: OPHTHALMOLOGY

## 2024-12-08 ASSESSMENT — KERATOMETRY
METHOD_AUTO_MANUAL: AUTO
OD_AXISANGLE_DEGREES: 073
OD_K1POWER_DIOPTERS: 42.25
OS_K2POWER_DIOPTERS: 43.21
OD_K2POWER_DIOPTERS: 43.25
OS_AXISANGLE_DEGREES: 106
OS_K1POWER_DIOPTERS: 42.25

## 2024-12-08 ASSESSMENT — REFRACTION_CURRENTRX
OD_VPRISM_DIRECTION: SV
OD_CYLINDER: +1.75
OS_AXIS: 115
OS_OVR_VA: 20/
OS_SPHERE: -11.75
OD_AXIS: 051
OD_SPHERE: -11.00
OD_OVR_VA: 20/
OS_VPRISM_DIRECTION: SV
OS_CYLINDER: +0.75

## 2024-12-08 ASSESSMENT — REFRACTION_AUTOREFRACTION
OS_CYLINDER: +1.25
OD_AXIS: 058
OS_SPHERE: -13.00
OD_CYLINDER: +1.00
OD_SPHERE: -11.25
OS_AXIS: 102

## 2024-12-08 ASSESSMENT — CORNEAL SURGICAL SCARRING: OD_SCARRING: MID PERIPHERAL ANTERIOR STROMAL

## 2024-12-08 ASSESSMENT — REFRACTION_MANIFEST
OD_CYLINDER: +1.00
OD_AXIS: 058
OD_SPHERE: -11.25
OS_AXIS: 102
OS_SPHERE: -13.00
OS_CYLINDER: +1.25

## 2024-12-08 ASSESSMENT — VISUAL ACUITY
OS_BCVA: 20/20-
OD_BCVA: 20/25-2

## 2024-12-08 ASSESSMENT — SUPERFICIAL PUNCTATE KERATITIS (SPK): OS_SPK: T

## 2024-12-11 ENCOUNTER — OFFICE (OUTPATIENT)
Dept: URBAN - METROPOLITAN AREA CLINIC 92 | Facility: CLINIC | Age: 34
Setting detail: OPHTHALMOLOGY
End: 2024-12-11
Payer: COMMERCIAL

## 2024-12-11 DIAGNOSIS — H16.041: ICD-10-CM

## 2024-12-11 PROCEDURE — 99213 OFFICE O/P EST LOW 20 MIN: CPT | Performed by: OPHTHALMOLOGY

## 2024-12-11 ASSESSMENT — VISUAL ACUITY
OD_BCVA: 20/25-2
OS_BCVA: 20/20-

## 2024-12-11 ASSESSMENT — REFRACTION_CURRENTRX
OS_CYLINDER: +0.75
OS_OVR_VA: 20/
OD_SPHERE: -11.00
OS_AXIS: 115
OD_AXIS: 051
OS_SPHERE: -11.75
OD_OVR_VA: 20/
OD_VPRISM_DIRECTION: SV
OD_CYLINDER: +1.75
OS_VPRISM_DIRECTION: SV

## 2024-12-11 ASSESSMENT — REFRACTION_MANIFEST
OS_AXIS: 102
OS_SPHERE: -13.00
OS_CYLINDER: +1.25
OD_CYLINDER: +1.00
OD_AXIS: 058
OD_SPHERE: -11.25

## 2024-12-11 ASSESSMENT — KERATOMETRY
OS_K1POWER_DIOPTERS: 42.25
OD_K1POWER_DIOPTERS: 42.25
METHOD_AUTO_MANUAL: AUTO
OD_AXISANGLE_DEGREES: 073
OS_K2POWER_DIOPTERS: 43.21
OD_K2POWER_DIOPTERS: 43.25
OS_AXISANGLE_DEGREES: 106

## 2024-12-11 ASSESSMENT — SUPERFICIAL PUNCTATE KERATITIS (SPK): OS_SPK: T

## 2024-12-11 ASSESSMENT — REFRACTION_AUTOREFRACTION
OD_SPHERE: -11.25
OS_SPHERE: -13.00
OS_CYLINDER: +1.25
OD_AXIS: 058
OD_CYLINDER: +1.00
OS_AXIS: 102

## 2024-12-11 ASSESSMENT — CORNEAL SURGICAL SCARRING: OD_SCARRING: MID PERIPHERAL ANTERIOR STROMAL

## 2024-12-18 ENCOUNTER — OFFICE (OUTPATIENT)
Dept: URBAN - METROPOLITAN AREA CLINIC 92 | Facility: CLINIC | Age: 34
Setting detail: OPHTHALMOLOGY
End: 2024-12-18
Payer: COMMERCIAL

## 2024-12-18 DIAGNOSIS — H16.041: ICD-10-CM

## 2024-12-18 PROBLEM — H16.222 DRY EYE SYNDROME K SICCA;  ,  , LEFT EYE: Status: ACTIVE | Noted: 2024-12-07

## 2024-12-18 PROCEDURE — 99213 OFFICE O/P EST LOW 20 MIN: CPT | Performed by: OPHTHALMOLOGY

## 2024-12-18 ASSESSMENT — REFRACTION_AUTOREFRACTION
OS_AXIS: 102
OS_SPHERE: -13.00
OS_CYLINDER: +1.25
OD_CYLINDER: +1.00
OD_SPHERE: -11.25
OD_AXIS: 058

## 2024-12-18 ASSESSMENT — REFRACTION_MANIFEST
OS_CYLINDER: +1.25
OS_AXIS: 102
OD_SPHERE: -11.25
OS_SPHERE: -13.00
OD_CYLINDER: +1.00
OD_AXIS: 058

## 2024-12-18 ASSESSMENT — KERATOMETRY
OD_K1POWER_DIOPTERS: 42.25
OS_K1POWER_DIOPTERS: 42.25
OD_AXISANGLE_DEGREES: 073
METHOD_AUTO_MANUAL: AUTO
OD_K2POWER_DIOPTERS: 43.25
OS_AXISANGLE_DEGREES: 106
OS_K2POWER_DIOPTERS: 43.21

## 2024-12-18 ASSESSMENT — REFRACTION_CURRENTRX
OD_AXIS: 051
OD_SPHERE: -11.00
OS_AXIS: 115
OD_OVR_VA: 20/
OD_VPRISM_DIRECTION: SV
OS_VPRISM_DIRECTION: SV
OS_SPHERE: -11.75
OS_CYLINDER: +0.75
OD_CYLINDER: +1.75
OS_OVR_VA: 20/

## 2024-12-18 ASSESSMENT — SUPERFICIAL PUNCTATE KERATITIS (SPK): OS_SPK: T

## 2024-12-18 ASSESSMENT — VISUAL ACUITY
OS_BCVA: 20/20-
OD_BCVA: 20/25-2

## 2024-12-18 ASSESSMENT — CORNEAL SURGICAL SCARRING: OD_SCARRING: MID PERIPHERAL ANTERIOR STROMAL

## 2025-01-29 ENCOUNTER — OUTPATIENT (OUTPATIENT)
Dept: OUTPATIENT SERVICES | Facility: HOSPITAL | Age: 35
LOS: 1 days | End: 2025-01-29

## 2025-01-29 ENCOUNTER — APPOINTMENT (OUTPATIENT)
Dept: MRI IMAGING | Facility: CLINIC | Age: 35
End: 2025-01-29

## 2025-01-29 PROCEDURE — 72197 MRI PELVIS W/O & W/DYE: CPT | Mod: 26

## 2025-04-28 ENCOUNTER — OFFICE (OUTPATIENT)
Facility: LOCATION | Age: 35
Setting detail: OPHTHALMOLOGY
End: 2025-04-28
Payer: COMMERCIAL

## 2025-04-28 DIAGNOSIS — H16.222: ICD-10-CM

## 2025-04-28 DIAGNOSIS — H01.004: ICD-10-CM

## 2025-04-28 DIAGNOSIS — H01.001: ICD-10-CM

## 2025-04-28 DIAGNOSIS — H52.13: ICD-10-CM

## 2025-04-28 DIAGNOSIS — H10.45: ICD-10-CM

## 2025-04-28 DIAGNOSIS — H01.002: ICD-10-CM

## 2025-04-28 DIAGNOSIS — H01.005: ICD-10-CM

## 2025-04-28 PROCEDURE — 99213 OFFICE O/P EST LOW 20 MIN: CPT | Performed by: STUDENT IN AN ORGANIZED HEALTH CARE EDUCATION/TRAINING PROGRAM

## 2025-04-28 ASSESSMENT — REFRACTION_MANIFEST
OS_CYLINDER: +1.25
OS_SPHERE: -13.00
OS_AXIS: 102
OD_SPHERE: -11.25
OD_AXIS: 058
OD_CYLINDER: +1.00

## 2025-04-28 ASSESSMENT — REFRACTION_AUTOREFRACTION
OS_CYLINDER: -1.00
OS_AXIS: 017
OD_CYLINDER: -1.25
OS_SPHERE: -11.25
OD_AXIS: 145
OD_SPHERE: -12.00

## 2025-04-28 ASSESSMENT — CONFRONTATIONAL VISUAL FIELD TEST (CVF)
OS_FINDINGS: FULL
OD_FINDINGS: FULL

## 2025-04-28 ASSESSMENT — KERATOMETRY
OS_K2POWER_DIOPTERS: 43.00
OD_AXISANGLE_DEGREES: 063
OS_AXISANGLE_DEGREES: 105
OS_K1POWER_DIOPTERS: 42.25
OD_K2POWER_DIOPTERS: 43.50
METHOD_AUTO_MANUAL: AUTO
OD_K1POWER_DIOPTERS: 42.25

## 2025-04-28 ASSESSMENT — REFRACTION_CURRENTRX
OD_SPHERE: -9.00
OS_CYLINDER: -0.75
OD_VPRISM_DIRECTION: SV
OS_AXIS: 018
OS_SPHERE: -11.75
OD_AXIS: 145
OD_CYLINDER: -1.75
OD_VPRISM_DIRECTION: SV
OS_AXIS: 115
OD_OVR_VA: 20/
OS_OVR_VA: 20/
OD_SPHERE: -11.00
OS_OVR_VA: 20/
OS_CYLINDER: +0.75
OD_CYLINDER: +1.75
OD_AXIS: 051
OD_OVR_VA: 20/
OS_VPRISM_DIRECTION: SV
OS_VPRISM_DIRECTION: SV
OS_SPHERE: -10.75

## 2025-04-28 ASSESSMENT — SUPERFICIAL PUNCTATE KERATITIS (SPK): OS_SPK: T

## 2025-04-28 ASSESSMENT — CORNEAL SURGICAL SCARRING: OD_SCARRING: MID PERIPHERAL ANTERIOR STROMAL

## 2025-04-28 ASSESSMENT — LID EXAM ASSESSMENTS
OD_BLEPHARITIS: RLL RUL 2+
OS_BLEPHARITIS: LLL LUL 2+

## 2025-04-28 ASSESSMENT — VISUAL ACUITY
OS_BCVA: 20/20-
OD_BCVA: 20/20-

## 2025-06-08 ENCOUNTER — RX ONLY (RX ONLY)
Age: 35
End: 2025-06-08

## 2025-06-08 ENCOUNTER — OFFICE (OUTPATIENT)
Dept: URBAN - METROPOLITAN AREA CLINIC 92 | Facility: CLINIC | Age: 35
Setting detail: OPHTHALMOLOGY
End: 2025-06-08
Payer: COMMERCIAL

## 2025-06-08 DIAGNOSIS — H52.13: ICD-10-CM

## 2025-06-08 DIAGNOSIS — H16.041: ICD-10-CM

## 2025-06-08 DIAGNOSIS — H01.002: ICD-10-CM

## 2025-06-08 DIAGNOSIS — H01.005: ICD-10-CM

## 2025-06-08 DIAGNOSIS — H01.004: ICD-10-CM

## 2025-06-08 DIAGNOSIS — H01.001: ICD-10-CM

## 2025-06-08 DIAGNOSIS — H16.222: ICD-10-CM

## 2025-06-08 PROCEDURE — 99214 OFFICE O/P EST MOD 30 MIN: CPT | Performed by: OPHTHALMOLOGY

## 2025-06-08 ASSESSMENT — CORNEAL SURGICAL SCARRING: OD_SCARRING: MID PERIPHERAL ANTERIOR STROMAL

## 2025-06-08 ASSESSMENT — SUPERFICIAL PUNCTATE KERATITIS (SPK): OS_SPK: T

## 2025-06-08 ASSESSMENT — LID EXAM ASSESSMENTS
OD_BLEPHARITIS: RLL RUL 2+
OS_BLEPHARITIS: LLL LUL 2+

## 2025-06-12 ASSESSMENT — REFRACTION_AUTOREFRACTION
OD_AXIS: 058
OD_CYLINDER: +1.25
OD_SPHERE: -11.50
OS_CYLINDER: +1.00
OS_AXIS: 102
OS_SPHERE: +12.50

## 2025-06-12 ASSESSMENT — REFRACTION_CURRENTRX
OS_VPRISM_DIRECTION: SV
OS_AXIS: 115
OD_AXIS: 051
OD_VPRISM_DIRECTION: SV
OS_OVR_VA: 20/
OS_OVR_VA: 20/
OD_OVR_VA: 20/
OS_SPHERE: -10.75
OD_OVR_VA: 20/
OD_AXIS: 145
OD_SPHERE: -11.00
OD_VPRISM_DIRECTION: SV
OS_SPHERE: -11.75
OS_VPRISM_DIRECTION: SV
OD_CYLINDER: +1.75
OD_SPHERE: -9.00
OS_CYLINDER: +0.75
OD_CYLINDER: -1.75
OS_CYLINDER: -0.75
OS_AXIS: 018

## 2025-06-12 ASSESSMENT — REFRACTION_MANIFEST
OD_CYLINDER: +1.00
OS_AXIS: 102
OD_SPHERE: -11.25
OD_AXIS: 058
OS_CYLINDER: +1.25
OS_SPHERE: -13.00

## 2025-06-12 ASSESSMENT — VISUAL ACUITY
OS_BCVA: 20/20-2
OD_BCVA: 20/20-

## 2025-06-12 ASSESSMENT — KERATOMETRY
OS_K2POWER_DIOPTERS: 43.00
OD_AXISANGLE_DEGREES: 072
OD_K2POWER_DIOPTERS: 43.50
OS_AXISANGLE_DEGREES: 110
OS_K1POWER_DIOPTERS: 42.25
OD_K1POWER_DIOPTERS: 42.25
METHOD_AUTO_MANUAL: AUTO

## 2025-06-24 ENCOUNTER — OFFICE (OUTPATIENT)
Dept: URBAN - METROPOLITAN AREA CLINIC 28 | Facility: CLINIC | Age: 35
Setting detail: OPHTHALMOLOGY
End: 2025-06-24
Payer: COMMERCIAL

## 2025-06-24 DIAGNOSIS — H16.041: ICD-10-CM

## 2025-06-24 DIAGNOSIS — H17.821: ICD-10-CM

## 2025-06-24 PROCEDURE — 99213 OFFICE O/P EST LOW 20 MIN: CPT | Performed by: SPECIALIST

## 2025-06-24 ASSESSMENT — CORNEAL SURGICAL SCARRING: OD_SCARRING: MID PERIPHERAL ANTERIOR STROMAL

## 2025-06-24 ASSESSMENT — REFRACTION_MANIFEST
OD_SPHERE: -11.25
OD_CYLINDER: +1.00
OS_AXIS: 102
OS_SPHERE: -13.00
OS_CYLINDER: +1.25
OD_AXIS: 058

## 2025-06-24 ASSESSMENT — REFRACTION_CURRENTRX
OS_VPRISM_DIRECTION: SV
OS_SPHERE: -11.75
OS_OVR_VA: 20/
OD_SPHERE: -9.00
OD_CYLINDER: +1.75
OD_AXIS: 145
OS_VPRISM_DIRECTION: SV
OS_AXIS: 115
OS_SPHERE: -10.75
OS_AXIS: 018
OD_AXIS: 051
OD_VPRISM_DIRECTION: SV
OS_CYLINDER: +0.75
OS_CYLINDER: -0.75
OD_CYLINDER: -1.75
OD_OVR_VA: 20/
OS_OVR_VA: 20/
OD_SPHERE: -11.00
OD_OVR_VA: 20/
OD_VPRISM_DIRECTION: SV

## 2025-06-24 ASSESSMENT — CONFRONTATIONAL VISUAL FIELD TEST (CVF)
OD_FINDINGS: FULL
OS_FINDINGS: FULL

## 2025-06-24 ASSESSMENT — LID EXAM ASSESSMENTS
OS_BLEPHARITIS: LLL LUL 2+
OD_BLEPHARITIS: RLL RUL 2+

## 2025-06-24 ASSESSMENT — REFRACTION_AUTOREFRACTION
OS_CYLINDER: +1.00
OD_AXIS: 058
OS_SPHERE: -12.50
OS_AXIS: 102
OD_SPHERE: -11.50
OD_CYLINDER: +1.25

## 2025-06-24 ASSESSMENT — KERATOMETRY
OS_K2POWER_DIOPTERS: 43.00
OD_K2POWER_DIOPTERS: 43.50
OS_AXISANGLE_DEGREES: 110
OD_K1POWER_DIOPTERS: 42.25
METHOD_AUTO_MANUAL: AUTO
OS_K1POWER_DIOPTERS: 42.25
OD_AXISANGLE_DEGREES: 072

## 2025-06-24 ASSESSMENT — SUPERFICIAL PUNCTATE KERATITIS (SPK): OS_SPK: T

## 2025-06-24 ASSESSMENT — VISUAL ACUITY
OS_BCVA: 20/20
OD_BCVA: 20/25-2

## 2025-07-17 ENCOUNTER — OFFICE (OUTPATIENT)
Dept: URBAN - METROPOLITAN AREA CLINIC 90 | Facility: CLINIC | Age: 35
Setting detail: OPHTHALMOLOGY
End: 2025-07-17
Payer: COMMERCIAL

## 2025-07-17 DIAGNOSIS — S05.01XA: ICD-10-CM

## 2025-07-17 DIAGNOSIS — H17.821: ICD-10-CM

## 2025-07-17 DIAGNOSIS — H16.041: ICD-10-CM

## 2025-07-17 PROCEDURE — 99213 OFFICE O/P EST LOW 20 MIN: CPT | Performed by: STUDENT IN AN ORGANIZED HEALTH CARE EDUCATION/TRAINING PROGRAM

## 2025-07-17 ASSESSMENT — REFRACTION_CURRENTRX
OD_AXIS: 145
OD_OVR_VA: 20/
OS_CYLINDER: -0.75
OS_VPRISM_DIRECTION: SV
OD_SPHERE: -9.00
OS_OVR_VA: 20/
OD_OVR_VA: 20/
OS_AXIS: 018
OS_SPHERE: -10.75
OD_CYLINDER: +1.75
OS_OVR_VA: 20/
OD_AXIS: 051
OD_VPRISM_DIRECTION: SV
OS_SPHERE: -11.75
OD_SPHERE: -11.00
OS_AXIS: 115
OD_CYLINDER: -1.75
OS_CYLINDER: +0.75

## 2025-07-17 ASSESSMENT — REFRACTION_MANIFEST
OS_SPHERE: -13.00
OD_AXIS: 058
OS_CYLINDER: +1.25
OS_AXIS: 102
OD_CYLINDER: +1.00
OD_SPHERE: -11.25

## 2025-07-17 ASSESSMENT — REFRACTION_AUTOREFRACTION
OS_CYLINDER: -1.00
OD_AXIS: 144
OS_AXIS: 015
OS_SPHERE: -11.25
OD_SPHERE: -9.50
OD_CYLINDER: -1.50

## 2025-07-17 ASSESSMENT — VISUAL ACUITY
OS_BCVA: 20/20
OD_BCVA: 20/20-1

## 2025-07-17 ASSESSMENT — KERATOMETRY
OD_K1POWER_DIOPTERS: 42.00
OS_K1POWER_DIOPTERS: 42.00
METHOD_AUTO_MANUAL: AUTO
OS_K2POWER_DIOPTERS: 43.00
OD_K2POWER_DIOPTERS: 43.00
OD_AXISANGLE_DEGREES: 064
OS_AXISANGLE_DEGREES: 112

## 2025-07-17 ASSESSMENT — CONFRONTATIONAL VISUAL FIELD TEST (CVF)
OD_FINDINGS: FULL
OS_FINDINGS: FULL

## 2025-07-17 ASSESSMENT — LID EXAM ASSESSMENTS
OS_BLEPHARITIS: LLL LUL 2+
OD_BLEPHARITIS: RLL RUL 2+

## 2025-07-17 ASSESSMENT — SUPERFICIAL PUNCTATE KERATITIS (SPK): OS_SPK: T

## 2025-07-17 ASSESSMENT — CORNEAL SURGICAL SCARRING: OD_SCARRING: MID PERIPHERAL ANTERIOR STROMAL

## 2025-07-23 ENCOUNTER — OFFICE (OUTPATIENT)
Dept: URBAN - METROPOLITAN AREA CLINIC 92 | Facility: CLINIC | Age: 35
Setting detail: OPHTHALMOLOGY
End: 2025-07-23
Payer: COMMERCIAL

## 2025-07-23 ENCOUNTER — RX ONLY (RX ONLY)
Age: 35
End: 2025-07-23

## 2025-07-23 DIAGNOSIS — H17.821: ICD-10-CM

## 2025-07-23 DIAGNOSIS — S05.01XA: ICD-10-CM

## 2025-07-23 DIAGNOSIS — H16.041: ICD-10-CM

## 2025-07-23 PROCEDURE — 99213 OFFICE O/P EST LOW 20 MIN: CPT | Performed by: OPHTHALMOLOGY

## 2025-07-23 ASSESSMENT — VISUAL ACUITY
OS_BCVA: 20/20
OD_BCVA: 20/20-1

## 2025-07-23 ASSESSMENT — REFRACTION_CURRENTRX
OS_OVR_VA: 20/
OS_VPRISM_DIRECTION: SV
OS_AXIS: 018
OD_AXIS: 051
OD_SPHERE: -11.00
OS_CYLINDER: -0.75
OD_AXIS: 145
OD_VPRISM_DIRECTION: SV
OS_SPHERE: -11.75
OS_SPHERE: -10.75
OD_OVR_VA: 20/
OS_AXIS: 115
OD_CYLINDER: -1.75
OS_OVR_VA: 20/
OD_CYLINDER: +1.75
OD_OVR_VA: 20/
OS_CYLINDER: +0.75
OD_SPHERE: -9.00

## 2025-07-23 ASSESSMENT — REFRACTION_AUTOREFRACTION
OS_CYLINDER: +1.00
OD_SPHERE: -11.50
OD_AXIS: 053
OD_CYLINDER: +1.50
OS_SPHERE: -12.75
OS_AXIS: 104

## 2025-07-23 ASSESSMENT — CORNEAL SURGICAL SCARRING: OD_SCARRING: MID PERIPHERAL ANTERIOR STROMAL

## 2025-07-23 ASSESSMENT — REFRACTION_MANIFEST
OD_CYLINDER: +1.00
OS_CYLINDER: +1.25
OS_SPHERE: -13.00
OS_AXIS: 102
OD_SPHERE: -11.25
OD_AXIS: 058

## 2025-07-23 ASSESSMENT — SUPERFICIAL PUNCTATE KERATITIS (SPK): OS_SPK: T

## 2025-07-23 ASSESSMENT — KERATOMETRY
OS_AXISANGLE_DEGREES: 111
OD_K2POWER_DIOPTERS: 43.25
METHOD_AUTO_MANUAL: AUTO
OS_K1POWER_DIOPTERS: 42.25
OD_AXISANGLE_DEGREES: 066
OS_K2POWER_DIOPTERS: 43.25
OD_K1POWER_DIOPTERS: 42.00